# Patient Record
Sex: FEMALE | Race: WHITE | Employment: UNEMPLOYED | URBAN - METROPOLITAN AREA
[De-identification: names, ages, dates, MRNs, and addresses within clinical notes are randomized per-mention and may not be internally consistent; named-entity substitution may affect disease eponyms.]

---

## 2017-02-21 ENCOUNTER — TELEPHONE (OUTPATIENT)
Dept: FAMILY MEDICINE | Facility: CLINIC | Age: 42
End: 2017-02-21

## 2017-04-29 ENCOUNTER — HOSPITAL ENCOUNTER (EMERGENCY)
Facility: CLINIC | Age: 42
Discharge: HOME | End: 2017-04-29
Attending: EMERGENCY MEDICINE | Admitting: EMERGENCY MEDICINE
Payer: COMMERCIAL

## 2017-04-29 VITALS
DIASTOLIC BLOOD PRESSURE: 92 MMHG | SYSTOLIC BLOOD PRESSURE: 130 MMHG | BODY MASS INDEX: 31.41 KG/M2 | OXYGEN SATURATION: 99 % | HEIGHT: 60 IN | RESPIRATION RATE: 16 BRPM | WEIGHT: 160 LBS | TEMPERATURE: 98.3 F

## 2017-04-29 DIAGNOSIS — J02.0 STREPTOCOCCAL PHARYNGITIS: ICD-10-CM

## 2017-04-29 PROCEDURE — 25000131 ZZH RX MED GY IP 250 OP 636 PS 637: Performed by: EMERGENCY MEDICINE

## 2017-04-29 PROCEDURE — 99283 EMERGENCY DEPT VISIT LOW MDM: CPT | Performed by: EMERGENCY MEDICINE

## 2017-04-29 PROCEDURE — 99284 EMERGENCY DEPT VISIT MOD MDM: CPT | Mod: Z6 | Performed by: EMERGENCY MEDICINE

## 2017-04-29 RX ORDER — AMOXICILLIN 500 MG/1
1000 CAPSULE ORAL 2 TIMES DAILY
Qty: 40 CAPSULE | Refills: 0 | Status: SHIPPED | OUTPATIENT
Start: 2017-04-29 | End: 2017-05-09

## 2017-04-29 RX ADMIN — DEXAMETHASONE 10 MG: 2 TABLET ORAL at 21:59

## 2017-04-29 ASSESSMENT — ENCOUNTER SYMPTOMS
FEVER: 0
SORE THROAT: 1

## 2017-04-29 NOTE — ED AVS SNAPSHOT
[facility], Emergency Department    [address] [address]    Phone:  [phone]                                       Starr Martinez   MRN: [id number]    Department:  [facility], Emergency Department   Date of Visit:  4/29/2017           Patient Information     Date Of Birth          1975        Your diagnoses for this visit were:     Streptococcal pharyngitis        You were seen by Henrietta Dang MD.      Follow-up Information     Follow up with Joycelyn Rodriguez PA-C.    Specialty:  Physician Assistant    Contact information:    [facility]  [address]  [facility] [address]  [phone]        24 Hour Appointment Hotline       To make an appointment at any [facility] clinic, call [phone] ([phone]). If you don't have a family doctor or clinic, we will help you find one. [place] clinics are conveniently located to serve the needs of you and your family.             Review of your medicines      START taking        Dose / Directions Last dose taken    amoxicillin 500 MG capsule   Commonly known as:  AMOXIL   Dose:  1000 mg   Quantity:  40 capsule        Take 2 capsules (1,000 mg) by mouth 2 times daily for 10 days   Refills:  0          Our records show that you are taking the medicines listed below. If these are incorrect, please call your family doctor or clinic.        Dose / Directions Last dose taken    IBUPROFEN PO   Dose:  200 mg        Take 200 mg by mouth every 4 hours as needed for moderate pain   Refills:  0        order for DME   Quantity:  1 each        Equipment being ordered: bilateral wrist braces   Refills:  0                Prescriptions were sent or printed at these locations (1 Prescription)                   Other Prescriptions                Printed at Department/Unit printer (1 of 1)         amoxicillin (AMOXIL) 500 MG capsule                Orders Needing Specimen Collection     None      Pending Results     No orders found  "from 2017 to 2017.            Pending Culture Results     No orders found from 2017 to 2017.            Thank you for choosing [facility]       Thank you for choosing [facility] for your care. Our goal is always to provide you with excellent care. Hearing back from our patients is one way we can continue to improve our services. Please take a few minutes to complete the written survey that you may receive in the mail after you visit with us. Thank you!        [employer] Information     [employer] lets you send messages to your doctor, view your test results, renew your prescriptions, schedule appointments and more. To sign up, go to [email or web address] . Click on \"Log in\" on the left side of the screen, which will take you to the Welcome page. Then click on \"Sign up Now\" on the right side of the page.     You will be asked to enter the access code listed below, as well as some personal information. Please follow the directions to create your username and password.     Your access code is: [email or web address]  Expires: 2017  9:43 PM     Your access code will  in 90 days. If you need help or a new code, please call your [facility] clinic or [phone].        Care EveryWhere ID     This is your Care EveryWhere ID. This could be used by other organizations to access your [facility] medical records  [id number]        After Visit Summary       This is your record. Keep this with you and show to your community pharmacist(s) and doctor(s) at your next visit.                  "

## 2017-04-30 NOTE — ED PROVIDER NOTES
History     Chief Complaint   Patient presents with     Throat Pain     HPI  Starr Martinez is a 41 year old female who presents for a sore throat. The patient reports developing a sore throat this afternoon. Her son was diagnosed with strep yesterday and her daughter also has a sore throat. Pain 4/10. Dull ache. No medications taken PTA. No fevers. No other concerns or complaints.     Allergies   Allergen Reactions     Codeine Palpitations     I have reviewed the Medications, Allergies, Past Medical and Surgical History, and Social History in the Epic system.    Review of Systems   Constitutional: Negative for fever.   HENT: Positive for sore throat.    All other systems reviewed and are negative.      Physical Exam   BP: (!) 146/93  Heart Rate: 95  Temp: 98.3  F (36.8  C)  Resp: 16  Height: 152.4 cm (5')  Weight: 72.6 kg (160 lb)  SpO2: 95 %  Physical Exam   Constitutional: She is oriented to person, place, and time. She appears well-developed and well-nourished.   HENT:   Head: Atraumatic.   Right Ear: External ear normal.   Left Ear: External ear normal.   Tonsillar erythema and swelling without exudate   Eyes: Conjunctivae and EOM are normal. No scleral icterus.   Neck: Normal range of motion. Neck supple.   Cardiovascular: Normal rate and regular rhythm.    Pulmonary/Chest: Effort normal. No respiratory distress.   Abdominal: Soft. There is no tenderness. There is no rebound and no guarding.   Musculoskeletal: Normal range of motion. She exhibits no edema or tenderness.   Neurological: She is alert and oriented to person, place, and time.   Skin: Skin is warm and dry. No rash noted.   Psychiatric: She has a normal mood and affect. Her behavior is normal.   Nursing note and vitals reviewed.        ED Course     9:15 PM  The patient was seen and examined by dr. Dang in [facility].     ED Course     Procedures         Assessments & Plan (with Medical Decision Making)   41 year old with strep contact and throat pain.  Treating family with amoxicillin.  - Will do amox x 10 days  - no evidence of abscess  - 10m dex for symptoms  - PCP follow up was needed    I have reviewed the nursing notes.    I have reviewed the findings, diagnosis, plan and need for follow up with the patient.    New Prescriptions    AMOXICILLIN (AMOXIL) 500 MG CAPSULE    Take 2 capsules (1,000 mg) by mouth 2 times daily for 10 days       Final diagnoses:   Streptococcal pharyngitis     ILynne, am serving as a trained medical scribe to document services personally performed by Henrietta Dang MD, based on the provider's statements to me.      IHenrietta MD, was physically present and have reviewed and verified the accuracy of this note documented by Lynne Hassan.     4/29/2017   [facility], [place], EMERGENCY DEPARTMENT     Henrietta Dang MD  04/29/17 5452

## 2017-07-01 ENCOUNTER — HEALTH MAINTENANCE LETTER (OUTPATIENT)
Age: 42
End: 2017-07-01

## 2017-10-16 ENCOUNTER — OFFICE VISIT (OUTPATIENT)
Dept: FAMILY MEDICINE | Facility: CLINIC | Age: 42
End: 2017-10-16

## 2017-10-16 VITALS
OXYGEN SATURATION: 97 % | TEMPERATURE: 98 F | DIASTOLIC BLOOD PRESSURE: 86 MMHG | BODY MASS INDEX: 33.04 KG/M2 | WEIGHT: 175 LBS | HEART RATE: 78 BPM | HEIGHT: 61 IN | RESPIRATION RATE: 14 BRPM | SYSTOLIC BLOOD PRESSURE: 121 MMHG

## 2017-10-16 DIAGNOSIS — M79.7 FIBROMYALGIA: ICD-10-CM

## 2017-10-16 DIAGNOSIS — Z72.0 TOBACCO USE: ICD-10-CM

## 2017-10-16 DIAGNOSIS — K90.0 CELIAC DISEASE: ICD-10-CM

## 2017-10-16 DIAGNOSIS — R10.32 ABDOMINAL PAIN, LEFT LOWER QUADRANT: Primary | ICD-10-CM

## 2017-10-16 DIAGNOSIS — R53.82 CHRONIC FATIGUE: ICD-10-CM

## 2017-10-16 LAB
ALBUMIN SERPL-MCNC: 3.6 G/DL (ref 3.4–5)
ALP SERPL-CCNC: 81 U/L (ref 40–150)
ALT SERPL W P-5'-P-CCNC: 23 U/L (ref 0–50)
ANION GAP SERPL CALCULATED.3IONS-SCNC: 6 MMOL/L (ref 3–14)
AST SERPL W P-5'-P-CCNC: 12 U/L (ref 0–45)
BASOPHILS # BLD AUTO: 0 10E9/L (ref 0–0.2)
BASOPHILS NFR BLD AUTO: 0.4 %
BILIRUB SERPL-MCNC: 0.5 MG/DL (ref 0.2–1.3)
BUN SERPL-MCNC: 12 MG/DL (ref 7–30)
CALCIUM SERPL-MCNC: 9 MG/DL (ref 8.5–10.1)
CHLORIDE SERPL-SCNC: 107 MMOL/L (ref 94–109)
CO2 SERPL-SCNC: 26 MMOL/L (ref 20–32)
CREAT SERPL-MCNC: 0.62 MG/DL (ref 0.52–1.04)
DIFFERENTIAL METHOD BLD: ABNORMAL
EOSINOPHIL # BLD AUTO: 0.8 10E9/L (ref 0–0.7)
EOSINOPHIL NFR BLD AUTO: 8.1 %
ERYTHROCYTE [DISTWIDTH] IN BLOOD BY AUTOMATED COUNT: 13.4 % (ref 10–15)
FERRITIN SERPL-MCNC: 30 NG/ML (ref 12–150)
GFR SERPL CREATININE-BSD FRML MDRD: >90 ML/MIN/1.7M2
GLUCOSE SERPL-MCNC: 79 MG/DL (ref 70–99)
HCT VFR BLD AUTO: 43.7 % (ref 35–47)
HGB BLD-MCNC: 14.5 G/DL (ref 11.7–15.7)
IMM GRANULOCYTES # BLD: 0 10E9/L (ref 0–0.4)
IMM GRANULOCYTES NFR BLD: 0.2 %
LYMPHOCYTES # BLD AUTO: 1.8 10E9/L (ref 0.8–5.3)
LYMPHOCYTES NFR BLD AUTO: 17.7 %
MCH RBC QN AUTO: 32.2 PG (ref 26.5–33)
MCHC RBC AUTO-ENTMCNC: 33.2 G/DL (ref 31.5–36.5)
MCV RBC AUTO: 97 FL (ref 78–100)
MONOCYTES # BLD AUTO: 0.8 10E9/L (ref 0–1.3)
MONOCYTES NFR BLD AUTO: 8.5 %
NEUTROPHILS # BLD AUTO: 6.4 10E9/L (ref 1.6–8.3)
NEUTROPHILS NFR BLD AUTO: 65.1 %
NRBC # BLD AUTO: 0 10*3/UL
NRBC BLD AUTO-RTO: 0 /100
PLATELET # BLD AUTO: 244 10E9/L (ref 150–450)
POTASSIUM SERPL-SCNC: 3.8 MMOL/L (ref 3.4–5.3)
PROT SERPL-MCNC: 7 G/DL (ref 6.8–8.8)
RBC # BLD AUTO: 4.51 10E12/L (ref 3.8–5.2)
SODIUM SERPL-SCNC: 140 MMOL/L (ref 133–144)
TSH SERPL DL<=0.005 MIU/L-ACNC: 0.92 MU/L (ref 0.4–4)
WBC # BLD AUTO: 9.9 10E9/L (ref 4–11)

## 2017-10-16 ASSESSMENT — ENCOUNTER SYMPTOMS
SORE THROAT: 0
COUGH: 0
CHEST TIGHTNESS: 0
LIGHT-HEADEDNESS: 0
RHINORRHEA: 0
NERVOUS/ANXIOUS: 1
ANAL BLEEDING: 0
HEADACHES: 0
ARTHRALGIAS: 1
SLEEP DISTURBANCE: 1
CONSTIPATION: 1
SHORTNESS OF BREATH: 0
ACTIVITY CHANGE: 1
DIARRHEA: 1
BLOOD IN STOOL: 0
DIZZINESS: 0
APPETITE CHANGE: 0
FLANK PAIN: 0
MYALGIAS: 1
FATIGUE: 1
VOMITING: 0
NAUSEA: 0
UNEXPECTED WEIGHT CHANGE: 0
ABDOMINAL PAIN: 1
CHILLS: 0
FEVER: 0

## 2017-10-16 NOTE — MR AVS SNAPSHOT
"              After Visit Summary   10/16/2017    Starr Martinez    MRN: [id number]           Patient Information     Date Of Birth          1975        Visit Information        Provider Department      10/16/2017 1:00 PM Oma Cifuentes NP [facility]        Today's Diagnoses     Abdominal pain, left lower quadrant    -  1    Celiac disease        Fibromyalgia        Chronic fatigue           Follow-ups after your visit        Additional Services     PHYSICAL THERAPY REFERRAL       *This therapy referral will be filtered to a centralized scheduling office at [facility] and the patient will receive a call to schedule an appointment at a [facility] location most convenient for them. *     [facility] provides Physical Therapy evaluation and treatment and many specialty services across the [facility] system.  If requesting a specialty program, please choose from the list below.    If you have not heard from the scheduling office within 2 business days, please call [phone] for all locations, with the exception of [place], please call [phone].  Treatment: Evaluation & Treatment  Special Instructions/Modalities: PT to eval and treat for fibromyalgia pain  Special Programs: Aquatic Therapy ([place], [place] and [place] locations only)    Please be aware that coverage of these services is subject to the terms and limitations of your health insurance plan.  Call member services at your health plan with any benefit or coverage questions.      **Note to Provider:  If you are referring outside of [facility] for the therapy appointment, please list the name of the location in the \"special instructions\" above, print the referral and give to the patient to schedule the appointment.                  Future tests that were ordered for you today     Open Future Orders        Priority Expected Expires Ordered    US Abd Cmpl Abd/Pelvis Duplex Cmpl Routine  10/16/2018 " "10/16/2017            Who to contact     Please call your clinic at [phone] to:    Ask questions about your health    Make or cancel appointments    Discuss your medicines    Learn about your test results    Speak to your doctor   If you have compliments or concerns about an experience at your clinic, or if you wish to file a complaint, please contact [facility] Patient Relations at [phone] or email us at [email or web address]         Additional Information About Your Visit        [facility] [employer] Information     [employer] is an electronic gateway that provides easy, online access to your medical records. With [employer], you can request a clinic appointment, read your test results, renew a prescription or communicate with your care team.     To sign up for [employer] visit the website at [email or web address]   You will be asked to enter the access code listed below, as well as some personal information. Please follow the directions to create your username and password.     Your access code is: [email or web address]  Expires: 2018  2:08 PM     Your access code will  in 90 days. If you need help or a new code, please contact your [facility] or call [phone] for assistance.        Care EveryWhere ID     This is your Care EveryWhere ID. This could be used by other organizations to access your [place] medical records  [id number]        Your Vitals Were     Pulse Temperature Respirations Height Pulse Oximetry Breastfeeding?    78 98  F (36.7  C) (Oral) 14 5' 0.7\" (154.2 cm) 97% No    BMI (Body Mass Index)                   33.39 kg/m2            Blood Pressure from Last 3 Encounters:   10/16/17 121/86   17 (!) 130/92   16 112/82    Weight from Last 3 Encounters:   10/16/17 175 lb (79.4 kg)   17 160 lb (72.6 kg)   16 174 lb 6.4 oz (79.1 kg)              We Performed the Following     CBC with platelets differential     " Comprehensive metabolic panel     Ferritin     PHYSICAL THERAPY REFERRAL     TSH     Vitamin D Deficiency        Primary Care Provider Office Phone # Fax #    Joycelyn Rodriguez PA-C [phone]       [address]  [place] [facility] [address]        Equal Access to Services     IRLANDA VÁZQUEZ : Hadii aad ku hadasho Soomaali, waaxda luqadaha, qaybta kaalmada adeegyada, waxay idiin hayaan adeeg khbearsh lasony hay. So [facility] [phone].    ATENCIÓN: Si habla español, tiene a palacio disposición servicios gratuitos de asistencia lingüística. Llame al [phone].    We comply with applicable federal civil rights laws and [place] laws. We do not discriminate on the basis of race, color, national origin, age, disability, sex, sexual orientation, or gender identity.            Thank you!     Thank you for choosing [facility]  for your care. Our goal is always to provide you with excellent care. Hearing back from our patients is one way we can continue to improve our services. Please take a few minutes to complete the written survey that you may receive in the mail after your visit with us. Thank you!             Your Updated Medication List - Protect others around you: Learn how to safely use, store and throw away your medicines at www.disposemymeds.org.          This list is accurate as of: 10/16/17  2:08 PM.  Always use your most recent med list.                   Brand Name Dispense Instructions for use Diagnosis    IBUPROFEN PO      Take 200 mg by mouth every 4 hours as needed for moderate pain

## 2017-10-16 NOTE — NURSING NOTE
"42 year old  Chief Complaint   Patient presents with     Pain     Lower abdomen on/off. Faigue. Pt has fibro, feels tired at work. Pain Scale 2/10       Blood pressure 121/86, pulse 78, temperature 98  F (36.7  C), temperature source Oral, resp. rate 14, height 5' 0.7\" (154.2 cm), weight 175 lb (79.4 kg), SpO2 97 %, not currently breastfeeding. Body mass index is 33.39 kg/(m^2).  BP completed using cuff size: regular    Patient Active Problem List   Diagnosis     Irritable bowel syndrome with both constipation and diarrhea       Wt Readings from Last 2 Encounters:   10/16/17 175 lb (79.4 kg)   04/29/17 160 lb (72.6 kg)     BP Readings from Last 3 Encounters:   10/16/17 121/86   04/29/17 (!) 130/92   12/21/16 112/82       Current Outpatient Prescriptions   Medication     IBUPROFEN PO     No current facility-administered medications for this visit.        Social History   Substance Use Topics     Smoking status: Current Every Day Smoker     Packs/day: 0.50     Smokeless tobacco: Never Used     Alcohol use Yes      Comment: rare       Health Maintenance Due   Topic Date Due     TETANUS IMMUNIZATION (SYSTEM ASSIGNED)  08/23/1992     PAP SCREENING Q3 YR (SYSTEM ASSIGNED)  06/02/2007     INFLUENZA VACCINE (SYSTEM ASSIGNED)  09/01/2017       No results found for: PAP    PHQ-2 ( 1999 Pfizer) 10/16/2017   Q1: Little interest or pleasure in doing things 0   Q2: Feeling down, depressed or hopeless 0   PHQ-2 Score 0       No flowsheet data found.    No flowsheet data found.    No flowsheet data found.    Michelle Williamson CMA  October 16, 2017 1:18 PM  "

## 2017-10-16 NOTE — PROGRESS NOTES
"      HPI:       Starr Martinez is a 42 year old who presents for the following  Patient presents with:  Pain: Lower abdomen on/off. Faigue. Pt has fibro, feels tired at work. Pain Scale 2/10    Starr is a 42 year old female with hx of Fibromyalgia and celiac disease and recently just recovered from Bell's Palsy. She comes to clinic with cc of LLQ abdominal pain, fatigue. The patient is new to this clinic and this writer.    LLQ abdominal pain: Pain started several months ago and is increasing in the number of episodes she gets. Pain location is in the left lower quadrant, sometimes she will get Stomach pain. Pain comes and goes, didn't go to work today, the pain episodes last for 1-2 days, she describes the pain like little pins stabbing her, nothing makes the pain worse or better. In general, with celiac disease, she has some irregular bowels for her baseline, she gets constipation and diarrhea. She will sometimes go 3-4 days without having a bm. Then gets diarrhea. Gets diarrhea and constipation. No blood in stool, no fevers, appetite stable, eating 1 meal a day. She does her best to avoid gluten but will sometimes \"cheat\" and this will disrupt her bowels. She has hx of cholecystectomy.    LMP 2 weeks ago, not on currently on cycle. Her menstrual cycle is Regular, monthly, lasts for 3-4 days. No hx of abnormal paps. No new partners, has been with boyfriend x 2 years.     Fibromyalgia and Fatigue: Hard to keep her eyes open, wants to fall asleep. She describes feeling weak in her chest and in her heart. Symptoms have been present for the last several months. Takes ibuprofen nearly daily 600-800 mg a day for fibromyalgia pain in her knees and hips. Tries to just take Ibuprofen once a day, does not like taking medication unless she has to. Her [place] sleepiness scale rating is 22 on the sleepiness scale    Problem, Medication and Allergy Lists were reviewed and are current.  Patient is a new patient to this clinic " "and so  I reviewed/updated the Past Medical History, the Family History and the Social History.     Past Medical History:   Diagnosis Date     Unspecified hypothyroidism      Unspecified otitis media      Past Surgical History:   Procedure Laterality Date     CHOLECYSTECTOMY       GYN SURGERY      uterine \"burned\"     SOFT TISSUE SURGERY      breast reduction     SURGICAL HISTORY OF -       cholecystectomy     Social History     Social History     Marital status: Single     Spouse name: N/A     Number of children: 3     Years of education: Some College     Occupational History           Customer service     Social History Main Topics     Smoking status: Current Every Day Smoker     Packs/day: 0.50     Smokeless tobacco: Never Used     Alcohol use Yes      Comment: rare once a month     Drug use: No     Sexual activity: Yes     Partners: Male     Other Topics Concern     Not on file     Social History Narrative    ** Merged History Encounter **          Family History   Problem Relation Age of Onset     Arthritis Paternal Grandmother      Eye Disorder Paternal Grandmother      OSTEOPOROSIS Paternal Grandmother      DIABETES Paternal Grandfather      Thyroid Disease Mother      DIABETES Father           Review of Systems:   Review of Systems   Constitutional: Positive for activity change and fatigue. Negative for appetite change, chills, fever and unexpected weight change.   HENT: Negative for congestion, postnasal drip, rhinorrhea and sore throat.    Respiratory: Negative for cough, chest tightness and shortness of breath.    Cardiovascular: Negative for chest pain.   Gastrointestinal: Positive for abdominal pain, constipation and diarrhea. Negative for anal bleeding, blood in stool, nausea and vomiting.   Endocrine: Negative for cold intolerance and heat intolerance.   Genitourinary: Negative for flank pain, menstrual problem, vaginal bleeding and vaginal pain.   Musculoskeletal: Positive for arthralgias and " "myalgias.   Skin: Negative for rash.   Neurological: Negative for dizziness, light-headedness and headaches.   Psychiatric/Behavioral: Positive for sleep disturbance. Negative for suicidal ideas. The patient is nervous/anxious.              Physical Exam:   Patient Vitals for the past 24 hrs:   BP Temp Temp src Pulse Resp SpO2 Height Weight   10/16/17 1316 121/86 98  F (36.7  C) Oral 78 14 97 % 5' 0.7\" (154.2 cm) 175 lb (79.4 kg)     Body mass index is 33.39 kg/(m^2).  Vitals were reviewed and were normal     Physical Exam   Constitutional: She is oriented to person, place, and time. She appears well-developed and well-nourished.   Appears tired with heavy eyelids   HENT:   Head: Normocephalic and atraumatic.   Right Ear: Tympanic membrane, external ear and ear canal normal.   Left Ear: Tympanic membrane, external ear and ear canal normal.   Nose: Nose normal.   Mouth/Throat: Mucous membranes are normal.   Eyes: Pupils are equal, round, and reactive to light.   Neck: Neck supple.   Cardiovascular: Normal rate and regular rhythm.    Pulmonary/Chest: Effort normal and breath sounds normal.   Abdominal: Soft. Bowel sounds are normal. There is no hepatosplenomegaly. There is tenderness in the left lower quadrant. There is no CVA tenderness.   Musculoskeletal: Normal range of motion.   Pain in knees and hips   Lymphadenopathy:     She has no cervical adenopathy.   Neurological: She is alert and oriented to person, place, and time.   Skin: Skin is warm and dry.   Psychiatric: She has a normal mood and affect. Her behavior is normal.         Results:      Results from the last 24 hoursNo results found for this or any previous visit (from the past 24 hour(s)).  Assessment and Plan     Starr was seen today for pain.    Diagnoses and all orders for this visit:    Abdominal pain, left lower quadrant  -     US Abd Cmpl Abd/Pelvis Duplex Cmpl; Future  LLQ abdominal pain x 3 months with pains occurring more frequently, pain " intermittent. Some discomfort to deep palpation on exam, no masses palpated. Get lab work. Consider pelvis/abdominal US if no improvement. Discussed pain may be related to her celiac disease and fibromyalgia. She did just start a new job, this has been a positive thing for her but has still created anxiety with the change. She started her new job 2 weeks ago.     Celiac disease  Educated patient to do her best with adhering to a gluten free diet to see if this helps the LLQ abdominal pain.     Fibromyalgia  -     PHYSICAL THERAPY REFERRAL  Discussed non-pharmacological options of treatment for fibromyalgia. She does not desire to see a counselor or therapist. She is interested in doing acupuncture or chiropractic therapy, she also is open to aquatic physical therapy. Reviewed her lifestyle patterns and behaviors, discussed the importance of regular activity, will get her started in PT to help her engage in some aquatic PT. Discussed the importance of a good support network.     Chronic fatigue  -     CBC with platelets differential  -     Comprehensive metabolic panel  -     TSH  -     Ferritin  -     Vitamin D Deficiency  Discussed that this may be related to her fibromyalgia, or it may not, will check labs.  Denies suicidal thoughts and feelings. Consider depression, anxiety. Encouraged patient to consider counseling services and to update clinic if she changes her mind and would like referral. She had a score of 22 on the [place] Sleepiness index and denies snoring or sleep apnea symptoms.     Tobacco use  Smoking 1/2 ppd of cig, desires to cut back, 3 minutes spent on smoking cessation counseling. She will cut back and update clinic if she desires smoking cessation products.     Medications Discontinued During This Encounter   Medication Reason     order for DME Therapy completed     Options for treatment and follow-up care were reviewed with the patient. Starr Martinez  engaged in the decision making process and  verbalized understanding of the options discussed and agreed with the final plan.    Oma Cifuentes, NP

## 2017-10-16 NOTE — LETTER
[facility]  [address] [address]  Phone: [phone]  Fax: [phone]    October 16, 2017        Starr Martinez  [address] [place] [id number]          To whom it may concern:    RE: Starr Martinez    Patient was seen and treated today at our clinic. Please excuse her from work.     Please contact me for questions or concerns [phone]      Sincerely,        Oma Cifuentes NP

## 2017-10-16 NOTE — LETTER
October 17, 2017       TO: Starr Martinez  [address] [facility] [address]       Dear ,    We are writing to inform you of your test results.    Test results indicate you may require additional follow up, see comment below.    Resulted Orders   CBC with platelets differential   Result Value Ref Range    WBC 9.9 4.0 - 11.0 10e9/L    RBC Count 4.51 3.8 - 5.2 10e12/L    Hemoglobin 14.5 11.7 - 15.7 g/dL    Hematocrit 43.7 35.0 - 47.0 %    MCV 97 78 - 100 fl    MCH 32.2 26.5 - 33.0 pg    MCHC 33.2 31.5 - 36.5 g/dL    RDW 13.4 10.0 - 15.0 %    Platelet Count 244 150 - 450 10e9/L    Diff Method Automated Method     % Neutrophils 65.1 %    % Lymphocytes 17.7 %    % Monocytes 8.5 %    % Eosinophils 8.1 %    % Basophils 0.4 %    % Immature Granulocytes 0.2 %    Nucleated RBCs 0 0 /100    Absolute Neutrophil 6.4 1.6 - 8.3 10e9/L    Absolute Lymphocytes 1.8 0.8 - 5.3 10e9/L    Absolute Monocytes 0.8 0.0 - 1.3 10e9/L    Absolute Eosinophils 0.8 (H) 0.0 - 0.7 10e9/L    Absolute Basophils 0.0 0.0 - 0.2 10e9/L    Abs Immature Granulocytes 0.0 0 - 0.4 10e9/L    Absolute Nucleated RBC 0.0    Comprehensive metabolic panel   Result Value Ref Range    Sodium 140 133 - 144 mmol/L    Potassium 3.8 3.4 - 5.3 mmol/L    Chloride 107 94 - 109 mmol/L    Carbon Dioxide 26 20 - 32 mmol/L    Anion Gap 6 3 - 14 mmol/L    Glucose 79 70 - 99 mg/dL    Urea Nitrogen 12 7 - 30 mg/dL    Creatinine 0.62 0.52 - 1.04 mg/dL    GFR Estimate >90 >60 mL/min/1.7m2      Comment:      Non  GFR Calc    GFR Estimate If Black >90 >60 mL/min/1.7m2      Comment:       GFR Calc    Calcium 9.0 8.5 - 10.1 mg/dL    Bilirubin Total 0.5 0.2 - 1.3 mg/dL    Albumin 3.6 3.4 - 5.0 g/dL    Protein Total 7.0 6.8 - 8.8 g/dL    Alkaline Phosphatase 81 40 - 150 U/L    ALT 23 0 - 50 U/L    AST 12 0 - 45 U/L   TSH   Result Value Ref Range    TSH 0.92 0.40 - 4.00 mU/L   Ferritin   Result Value Ref Range    Ferritin 30 12 - 150 ng/mL    Vitamin D Deficiency   Result Value Ref Range    Vitamin D Deficiency screening 16 (L) 20 - 75 ug/L      Comment:      Season, race, dietary intake, and treatment affect the concentration of   25-hydroxy-Vitamin D. Values may decrease during winter months and increase   during summer months. Values 20-29 ug/L may indicate Vitamin D insufficiency   and values <20 ug/L may indicate Vitamin D deficiency.  Vitamin D determination is routinely performed by an immunoassay specific for   25 hydroxyvitamin D3.  If an individual is on vitamin D2 (ergocalciferol)   supplementation, please specify 25 OH vitamin D2 and D3 level determination by   LCMSMS test VITD23.       You have a low vitamin D level at 16. Please start your vitamin D supplementation with 50,000 international units (IU) by mouth weekly for eight weeks. After completion of this, please follow up with over the counter supplementation of vitamin D 1,000 IU daily.  If you have questions, please call the clinic at [phone].    Sincerely,     Oma Cifuentes DNP, RN, FNP-BC

## 2017-10-17 ENCOUNTER — TELEPHONE (OUTPATIENT)
Dept: FAMILY MEDICINE | Facility: CLINIC | Age: 42
End: 2017-10-17

## 2017-10-17 DIAGNOSIS — E55.9 VITAMIN D DEFICIENCY: Primary | ICD-10-CM

## 2017-10-17 LAB — DEPRECATED CALCIDIOL+CALCIFEROL SERPL-MC: 16 UG/L (ref 20–75)

## 2017-10-17 NOTE — TELEPHONE ENCOUNTER
Left VM, patient with vitamin D deficiency. Will start supplementation. Will discuss if patient would prefer to monitor abdominal pain or proceed with US.

## 2017-10-24 ENCOUNTER — OFFICE VISIT (OUTPATIENT)
Dept: FAMILY MEDICINE | Facility: CLINIC | Age: 42
End: 2017-10-24
Payer: COMMERCIAL

## 2017-10-24 ENCOUNTER — TELEPHONE (OUTPATIENT)
Dept: FAMILY MEDICINE | Facility: CLINIC | Age: 42
End: 2017-10-24

## 2017-10-24 VITALS
DIASTOLIC BLOOD PRESSURE: 86 MMHG | HEART RATE: 68 BPM | SYSTOLIC BLOOD PRESSURE: 133 MMHG | BODY MASS INDEX: 34.16 KG/M2 | OXYGEN SATURATION: 95 % | WEIGHT: 179 LBS | TEMPERATURE: 97.6 F

## 2017-10-24 DIAGNOSIS — J22 LOWER RESPIRATORY INFECTION (E.G., BRONCHITIS, PNEUMONIA, PNEUMONITIS, PULMONITIS): Primary | ICD-10-CM

## 2017-10-24 DIAGNOSIS — Z71.6 ENCOUNTER FOR TOBACCO USE CESSATION COUNSELING: ICD-10-CM

## 2017-10-24 PROCEDURE — 99213 OFFICE O/P EST LOW 20 MIN: CPT | Performed by: NURSE PRACTITIONER

## 2017-10-24 RX ORDER — VARENICLINE TARTRATE 1 MG/1
1 TABLET, FILM COATED ORAL 2 TIMES DAILY
Qty: 60 TABLET | Refills: 2 | Status: SHIPPED | OUTPATIENT
Start: 2017-10-24

## 2017-10-24 RX ORDER — AZITHROMYCIN 250 MG/1
TABLET, FILM COATED ORAL
Qty: 6 TABLET | Refills: 0 | Status: SHIPPED | OUTPATIENT
Start: 2017-10-24

## 2017-10-24 ASSESSMENT — PAIN SCALES - GENERAL: PAINLEVEL: NO PAIN (0)

## 2017-10-24 ASSESSMENT — PATIENT HEALTH QUESTIONNAIRE - PHQ9: SUM OF ALL RESPONSES TO PHQ QUESTIONS 1-9: 0

## 2017-10-24 NOTE — TELEPHONE ENCOUNTER
Panel Management Review      Patient has the following on her problem list:     Depression / Dysthymia review    Measure:  Needs PHQ-9 score of 4 or less during index window.  Administer PHQ-9 and if score is 5 or more, send encounter to provider for next steps.    5 - 7 month window range:     PHQ-9 SCORE 10/24/2017   Total Score 0       If PHQ-9 recheck is 5 or more, route to provider for next steps.    Patient is due for:  PHQ9 and DAP        Composite cancer screening  Chart review shows that this patient is due/due soon for the following Pap Smear  Summary:    Patient is due/failing the following:   DAP, PAP and PHQ9    Action needed:   Patient needs office visit for physical with pap screen and patient needs to do PHQ9 and DAP.    Type of outreach:    Spoke with patient and scheduled physical with pap screen for 10/28/17 with Yuki Barrett CP and PHQ9 done score is 0    Questions for provider review:    None                                                                                                                                    ZOE De Paz MA       Chart routed to closing chart.   .

## 2017-10-24 NOTE — MR AVS SNAPSHOT
"              After Visit Summary   10/24/2017    Starr Martinez    MRN: [id number]           Patient Information     Date Of Birth          1975        Visit Information        Provider Department      10/24/2017 4:00 PM Yuki Barrett APRN CNP [facility]        Today's Diagnoses     Lower respiratory infection (e.g., bronchitis, pneumonia, pneumonitis, pulmonitis)    -  1    Encounter for tobacco use cessation counseling           Follow-ups after your visit        Your next 10 appointments already scheduled     Nov 28, 2017  5:40 PM CST   PHYSICAL with ALETHA Nuñez CNP   [facility]    [address] [facility] [id number] [phone]              Who to contact     If you have questions or need follow up information about today's clinic visit or your schedule please contact [facility] directly at [phone].  Normal or non-critical lab and imaging results will be communicated to you by MyChart, letter or phone within 4 business days after the clinic has received the results. If you do not hear from us within 7 days, please contact the clinic through [email or web address] or phone. If you have a critical or abnormal lab result, we will notify you by phone as soon as possible.  Submit refill requests through [employer] or call your pharmacy and they will forward the refill request to us. Please allow 3 business days for your refill to be completed.          Additional Information About Your Visit        [email or web address] Information     [employer] lets you send messages to your doctor, view your test results, renew your prescriptions, schedule appointments and more. To sign up, go to [email or web address] . Click on \"Log in\" on the left side of the screen, which will take you to the Welcome page. Then click on \"Sign up Now\" on the right side of the page.     You will be asked to " enter the access code listed below, as well as some personal information. Please follow the directions to create your username and password.     Your access code is: [email or web address]  Expires: 2018  2:08 PM     Your access code will  in 90 days. If you need help or a new code, please call your [facility] clinic or [phone].        Care EveryWhere ID     This is your Care EveryWhere ID. This could be used by other organizations to access your [facility] medical records  [id number]        Your Vitals Were     Pulse Temperature Pulse Oximetry Breastfeeding? BMI (Body Mass Index)       68 97.6  F (36.4  C) (Oral) 95% No 34.16 kg/m2        Blood Pressure from Last 3 Encounters:   10/24/17 133/86   10/16/17 121/86   17 (!) 130/92    Weight from Last 3 Encounters:   10/24/17 179 lb (81.2 kg)   10/16/17 175 lb (79.4 kg)   17 160 lb (72.6 kg)              Today, you had the following     No orders found for display         Today's Medication Changes          These changes are accurate as of: 10/24/17  4:40 PM.  If you have any questions, ask your nurse or doctor.               Start taking these medicines.        Dose/Directions    azithromycin 250 MG tablet   Commonly known as:  ZITHROMAX   Used for:  Lower respiratory infection (e.g., bronchitis, pneumonia, pneumonitis, pulmonitis)   Started by:  Yuki Barrett APRN CNP        Two tablets first day, then one tablet daily for four days.   Quantity:  6 tablet   Refills:  0       * varenicline 0.5 MG X 11 & 1 MG X 42 tablet   Commonly known as:  CHANTIX STARTING MONTH    Used for:  Encounter for tobacco use cessation counseling   Started by:  Yuki Barrett APRN CNP        Take 0.5 mg tab daily for 3 days, then 0.5 mg tab twice daily for 4 days, then 1 mg twice daily.   Quantity:  53 tablet   Refills:  0       * varenicline 1 MG tablet   Commonly known as:  CHANTIX   Used for:  Encounter for tobacco use cessation counseling   Started  by:  Yuki Barrett APRN CNP        Dose:  1 mg   Take 1 tablet (1 mg) by mouth 2 times daily   Quantity:  60 tablet   Refills:  2       * Notice:  This list has 2 medication(s) that are the same as other medications prescribed for you. Read the directions carefully, and ask your doctor or other care provider to review them with you.         Where to get your medicines      These medications were sent to [facility] - [place], [place] - [address]  [address], [facility] [address]     Phone:  [phone]     azithromycin 250 MG tablet    varenicline 0.5 MG X 11 & 1 MG X 42 tablet    varenicline 1 MG tablet                Primary Care Provider Office Phone # Fax #    Joycelyn Rodriguez PA-C [phone]       [address]  [place] [facility] [address]        Equal Access to Services     [facility]: Hadii aad ku hadasho Soomaali, waaxda luqadaha, qaybta kaalmada adeegyada, waxay idiin hayaan osvaldo chen . So [place] -[phone].    ATENCIÓN: Si habla español, tiene a palacio disposición servicios gratuitos de asistencia lingüística. Manasairina al [phone].    We comply with applicable federal civil rights laws and [place] laws. We do not discriminate on the basis of race, color, national origin, age, disability, sex, sexual orientation, or gender identity.            Thank you!     Thank you for choosing [facility]  for your care. Our goal is always to provide you with excellent care. Hearing back from our patients is one way we can continue to improve our services. Please take a few minutes to complete the written survey that you may receive in the mail after your visit with us. Thank you!             Your Updated Medication List - Protect others around you: Learn how to safely use, store and throw away your medicines at www.disposemymeds.org.          This list is accurate as of: 10/24/17  4:40 PM.  Always use  your most recent med list.                   Brand Name Dispense Instructions for use Diagnosis    azithromycin 250 MG tablet    ZITHROMAX    6 tablet    Two tablets first day, then one tablet daily for four days.    Lower respiratory infection (e.g., bronchitis, pneumonia, pneumonitis, pulmonitis)       cholecalciferol 47379 UNITS capsule    VITAMIN D3    8 capsule    Take 1 capsule (50,000 Units) by mouth once a week    Vitamin D deficiency       IBUPROFEN PO      Take 200 mg by mouth every 4 hours as needed for moderate pain        * varenicline 0.5 MG X 11 & 1 MG X 42 tablet    CHANTIX STARTING MONTH FLORENCIA    53 tablet    Take 0.5 mg tab daily for 3 days, then 0.5 mg tab twice daily for 4 days, then 1 mg twice daily.    Encounter for tobacco use cessation counseling       * varenicline 1 MG tablet    CHANTIX    60 tablet    Take 1 tablet (1 mg) by mouth 2 times daily    Encounter for tobacco use cessation counseling       * Notice:  This list has 2 medication(s) that are the same as other medications prescribed for you. Read the directions carefully, and ask your doctor or other care provider to review them with you.

## 2017-10-24 NOTE — NURSING NOTE
"Chief Complaint   Patient presents with     URI       Initial /86 (BP Location: Right arm, Patient Position: Chair, Cuff Size: Adult Regular)  Pulse 68  Temp 97.6  F (36.4  C) (Oral)  Wt 179 lb (81.2 kg)  SpO2 95%  Breastfeeding? No  BMI 34.16 kg/m2 Estimated body mass index is 34.16 kg/(m^2) as calculated from the following:    Height as of 10/16/17: 5' 0.7\" (1.542 m).    Weight as of this encounter: 179 lb (81.2 kg).  Medication Reconciliation: complete.  ZOE De Paz MA      "

## 2017-10-24 NOTE — PROGRESS NOTES
"  SUBJECTIVE:   Starr Martinez is a 42 year old female who presents to clinic today for the following health issues:      Acute Illness   Acute illness concerns: Cough  Onset: 7 days    Fever: no    Chills/Sweats: no    Headache (location?): no    Sinus Pressure:YES- facial pain    Conjunctivitis:  no    Ear Pain: no    Rhinorrhea: YES    Congestion: YES, chest    Sore Throat: no     Cough: YES-productive of yellow sputum    Wheeze: YES    Decreased Appetite: no    Nausea: no    Vomiting: no    Diarrhea:  no    Dysuria/Freq.: no    Fatigue/Achiness: YES    Sick/Strep Exposure: no     Therapies Tried and outcome: OTC stevan seltzer nightquil and dayquil    She is a smoker  No fevers  Cough worsening over past week and feels SOB    She is interested in quitting smoking  Has tried Chantix in the past  Stopped taking. Not sure why  Tolerated in the past    Problem list and histories reviewed & adjusted, as indicated.  Additional history: none    Patient Active Problem List   Diagnosis     Irritable bowel syndrome with both constipation and diarrhea     Abdominal pain, left lower quadrant     Celiac disease     Fibromyalgia     Chronic fatigue     Tobacco use     Depression with anxiety     Vitamin D deficiency     Past Surgical History:   Procedure Laterality Date     CHOLECYSTECTOMY       GYN SURGERY      uterine \"burned\"     SOFT TISSUE SURGERY      breast reduction     SURGICAL HISTORY OF -       cholecystectomy       Social History   Substance Use Topics     Smoking status: Current Every Day Smoker     Packs/day: 0.50     Smokeless tobacco: Never Used     Alcohol use Yes      Comment: rare once a month     Family History   Problem Relation Age of Onset     Arthritis Paternal Grandmother      Eye Disorder Paternal Grandmother      OSTEOPOROSIS Paternal Grandmother      DIABETES Paternal Grandfather      Thyroid Disease Mother      DIABETES Father              Reviewed and updated as needed this visit by clinical " staffTobacco  Allergies  Meds  Med Hx  Surg Hx  Fam Hx  Soc Hx      Reviewed and updated as needed this visit by Provider         ROS:  Constitutional, HEENT, cardiovascular, pulmonary, gi and gu systems are negative, except as otherwise noted.      OBJECTIVE:   /86 (BP Location: Right arm, Patient Position: Chair, Cuff Size: Adult Regular)  Pulse 68  Temp 97.6  F (36.4  C) (Oral)  Wt 179 lb (81.2 kg)  SpO2 95%  Breastfeeding? No  BMI 34.16 kg/m2  Body mass index is 34.16 kg/(m^2).  GENERAL: healthy, alert and no distress  HENT: ear canals and TM's normal, nose and mouth without ulcers or lesions  NECK: no adenopathy, no asymmetry, masses, or scars and thyroid normal to palpation  RESP: good excursion bilaterally. No crackles. Scattered expiratory wheezes  CV: regular rate and rhythm, normal S1 S2, no S3 or S4, no murmur, click or rub, no peripheral edema and peripheral pulses strong    Diagnostic Test Results:  none     ASSESSMENT/PLAN:       ICD-10-CM    1. Lower respiratory infection (e.g., bronchitis, pneumonia, pneumonitis, pulmonitis) J22 azithromycin (ZITHROMAX) 250 MG tablet   2. Encounter for tobacco use cessation counseling Z71.6 varenicline (CHANTIX STARTING MONTH PAK) 0.5 MG X 11 & 1 MG X 42 tablet     varenicline (CHANTIX) 1 MG tablet       Discussed potential risks and benefits of Chantix and proper usage  Discussed risk of depression and suicidal mood and advised to follow up in clinic and stop taking if developing concerning side effects  Will take for 12 weeks, follow up in clinic if feels will need additional treatment    ALETHA Nunez [facility]

## 2018-07-09 ENCOUNTER — TELEPHONE (OUTPATIENT)
Dept: FAMILY MEDICINE | Facility: CLINIC | Age: 43
End: 2018-07-09

## 2018-07-09 NOTE — LETTER
Please complete the PHQ9  questionnaire and mail back to the clinic in the self addressed envelope provided, thank you.    Enclosed with this letter is a questionnaire about depression for your upcoming visit. Please fill this out and mail back or contact us. We care about you and want to make sure you get the best care possible. If at any time you feel unsafe or have concerns about safety of others please take  immediate action by calling [phone], for Mental Health Crisis phone support 24 hours a day, 365 days per year. As always, you can also go the your local Emergency Room, or call 911 if you have immediate safety concerns.        Yuki Barrett, CNP

## 2018-07-09 NOTE — LETTER
July 20, 2018    Starr Martinez  [address] [facility] [address]      Dear Starr Martinez,     We have tried to contact you about your health, but have been unable to reach you.  Please call us as soon as possible so we can provide you with the best care possible.  We will continue to check in with you throughout the year to complete these items of care, if you are not able to complete these items at this time.  If you would like to complete the missing items for your care, please contact us at [phone].    We recommend the following:  -schedule a PAP SMEAR EXAM which is due.  Please disregard this reminder if you have had this exam elsewhere within the last year.  It would be helpful for us to have a copy of your recent pap smear report in our file so that we can best coordinate your care.  And to follow up on your Depression      Sincerely,     Your Care Team at [facility]    Yuki Barrett CNP/meg

## 2018-07-09 NOTE — LETTER
July 9, 2018    Starr Martinez  [address] [address]    Dear Starr    We care about your health and have reviewed your health plan. We have reviewed your medical conditions, medication list, and lab results and are making recommendations based on this review, to better manage your health.    You are in particular need of attention regarding:  - Your Depression  - Scheduling a Physical with a Cervical Cancer Screening (Pap Smear) age 64 and younger [phone]      Here is a list of Health Maintenance topics that are due now or due soon:  Health Maintenance Due   Topic Date Due     TETANUS IMMUNIZATION (SYSTEM ASSIGNED)  08/23/1992     DEPRESSION ACTION PLAN Q1 YR  08/23/1992     HIV SCREEN (SYSTEM ASSIGNED)  08/23/1992     PAP SCREENING Q3 YR (SYSTEM ASSIGNED)  06/02/2007     PHQ-9 Q6 MONTHS  04/24/2018     We will be calling you in the next couple of weeks to help you schedule any appointments that are needed.  Please call us at [phone] (or use Bench) to address the above recommendations.     Thank you for trusting [facility] and we appreciate the opportunity to serve you.  We look forward to supporting your healthcare needs in the future.    Healthy Regards,    Yuki Barrett, JONY/cm

## 2018-07-09 NOTE — TELEPHONE ENCOUNTER
Panel Management Review      Patient has the following on her problem list:     Depression / Dysthymia review    Measure:  Needs PHQ-9 score of 4 or less during index window.  Administer PHQ-9 and if score is 5 or more, send encounter to provider for next steps.    5 - 7 month window range: ?    PHQ-9 SCORE 10/24/2017   Total Score 0       If PHQ-9 recheck is 5 or more, route to provider for next steps.    Patient is due for:  PHQ9 and DAP      Composite cancer screening  Chart review shows that this patient is due/due soon for the following Pap Smear  Summary:    Patient is due/failing the following:   PAP and PHQ9    Action needed:   Patient needs office visit for pap screen.  Patient needs to do PHQ9.    Type of outreach:    Phone, left message for patient to call back.     Questions for provider review:    None                                                                                                                                    ZOE De Paz MA       Chart routed to closing chart .

## 2019-09-04 ENCOUNTER — TELEPHONE (OUTPATIENT)
Dept: URGENT CARE | Facility: URGENT CARE | Age: 44
End: 2019-09-04

## 2019-09-04 ENCOUNTER — OFFICE VISIT (OUTPATIENT)
Dept: URGENT CARE | Facility: URGENT CARE | Age: 44
End: 2019-09-04
Payer: COMMERCIAL

## 2019-09-04 VITALS
HEART RATE: 65 BPM | WEIGHT: 169 LBS | TEMPERATURE: 97.8 F | BODY MASS INDEX: 32.25 KG/M2 | SYSTOLIC BLOOD PRESSURE: 131 MMHG | OXYGEN SATURATION: 96 % | DIASTOLIC BLOOD PRESSURE: 89 MMHG

## 2019-09-04 DIAGNOSIS — M54.50 ACUTE LOW BACK PAIN WITHOUT SCIATICA, UNSPECIFIED BACK PAIN LATERALITY: ICD-10-CM

## 2019-09-04 DIAGNOSIS — R10.9 FLANK PAIN: Primary | ICD-10-CM

## 2019-09-04 LAB
ALBUMIN SERPL-MCNC: 3.5 G/DL (ref 3.4–5)
ALBUMIN UR-MCNC: NEGATIVE MG/DL
ALP SERPL-CCNC: 83 U/L (ref 40–150)
ALT SERPL W P-5'-P-CCNC: 25 U/L (ref 0–50)
ANION GAP SERPL CALCULATED.3IONS-SCNC: 8 MMOL/L (ref 6–17)
APPEARANCE UR: ABNORMAL
AST SERPL W P-5'-P-CCNC: 21 U/L (ref 0–45)
BASOPHILS # BLD AUTO: 0 10E9/L (ref 0–0.2)
BASOPHILS NFR BLD AUTO: 0.5 %
BILIRUB SERPL-MCNC: 0.7 MG/DL (ref 0.2–1.3)
BILIRUB UR QL STRIP: NEGATIVE
BUN SERPL-MCNC: 12 MG/DL (ref 7–30)
CALCIUM SERPL-MCNC: 9.2 MG/DL (ref 8.5–10.1)
CHLORIDE SERPL-SCNC: 103 MMOL/L (ref 94–109)
CO2 SERPL-SCNC: 32 MMOL/L (ref 20–32)
COLOR UR AUTO: YELLOW
CREAT SERPL-MCNC: 1.1 MG/DL (ref 0.52–1.04)
DIFFERENTIAL METHOD BLD: NORMAL
EOSINOPHIL # BLD AUTO: 0.6 10E9/L (ref 0–0.7)
EOSINOPHIL NFR BLD AUTO: 8.1 %
ERYTHROCYTE [DISTWIDTH] IN BLOOD BY AUTOMATED COUNT: 12.7 % (ref 10–15)
GFR SERPL CREATININE-BSD FRML MDRD: 60 ML/MIN/{1.73_M2}
GLUCOSE SERPL-MCNC: 88 MG/DL (ref 70–99)
GLUCOSE UR STRIP-MCNC: NEGATIVE MG/DL
HCT VFR BLD AUTO: 44.1 % (ref 35–47)
HGB BLD-MCNC: 14.6 G/DL (ref 11.7–15.7)
HGB UR QL STRIP: ABNORMAL
KETONES UR STRIP-MCNC: NEGATIVE MG/DL
LEUKOCYTE ESTERASE UR QL STRIP: NEGATIVE
LYMPHOCYTES # BLD AUTO: 2.2 10E9/L (ref 0.8–5.3)
LYMPHOCYTES NFR BLD AUTO: 29.5 %
MCH RBC QN AUTO: 32 PG (ref 26.5–33)
MCHC RBC AUTO-ENTMCNC: 33.1 G/DL (ref 31.5–36.5)
MCV RBC AUTO: 97 FL (ref 78–100)
MONOCYTES # BLD AUTO: 0.6 10E9/L (ref 0–1.3)
MONOCYTES NFR BLD AUTO: 7.4 %
NEUTROPHILS # BLD AUTO: 4.1 10E9/L (ref 1.6–8.3)
NEUTROPHILS NFR BLD AUTO: 54.5 %
NITRATE UR QL: NEGATIVE
PH UR STRIP: 7 PH (ref 5–7)
PLATELET # BLD AUTO: 250 10E9/L (ref 150–450)
POTASSIUM SERPL-SCNC: 3.7 MMOL/L (ref 3.4–5.3)
PROT SERPL-MCNC: 7 G/DL (ref 6.8–8.8)
RBC # BLD AUTO: 4.56 10E12/L (ref 3.8–5.2)
RBC #/AREA URNS AUTO: NORMAL /HPF
SODIUM SERPL-SCNC: 143 MMOL/L (ref 133–144)
SOURCE: ABNORMAL
SP GR UR STRIP: 1.01 (ref 1–1.03)
UROBILINOGEN UR STRIP-ACNC: 0.2 EU/DL (ref 0.2–1)
WBC # BLD AUTO: 7.5 10E9/L (ref 4–11)
WBC #/AREA URNS AUTO: NORMAL /HPF

## 2019-09-04 PROCEDURE — 99214 OFFICE O/P EST MOD 30 MIN: CPT | Performed by: NURSE PRACTITIONER

## 2019-09-04 PROCEDURE — 36415 COLL VENOUS BLD VENIPUNCTURE: CPT | Performed by: NURSE PRACTITIONER

## 2019-09-04 PROCEDURE — 81001 URINALYSIS AUTO W/SCOPE: CPT | Performed by: NURSE PRACTITIONER

## 2019-09-04 PROCEDURE — 80053 COMPREHEN METABOLIC PANEL: CPT | Performed by: NURSE PRACTITIONER

## 2019-09-04 PROCEDURE — 85025 COMPLETE CBC W/AUTO DIFF WBC: CPT | Performed by: NURSE PRACTITIONER

## 2019-09-04 RX ORDER — TAMSULOSIN HYDROCHLORIDE 0.4 MG/1
0.4 CAPSULE ORAL DAILY
Qty: 28 CAPSULE | Refills: 0 | Status: SHIPPED | OUTPATIENT
Start: 2019-09-04

## 2019-09-04 RX ORDER — CYCLOBENZAPRINE HCL 5 MG
5-10 TABLET ORAL 3 TIMES DAILY PRN
Qty: 30 TABLET | Refills: 0 | Status: SHIPPED | OUTPATIENT
Start: 2019-09-04

## 2019-09-04 ASSESSMENT — ENCOUNTER SYMPTOMS
HEMATURIA: 0
DYSURIA: 0
BACK PAIN: 1
ABDOMINAL PAIN: 1
FEVER: 0
VOMITING: 0
MYALGIAS: 1
NAUSEA: 0
FLANK PAIN: 1

## 2019-09-04 NOTE — LETTER
[facility]  [address]  [facility] [address]      September 4, 2019    RE:  Starr Martinez                                                                                                                                                       [address] [facility] [address]            To whom it may concern:    Starr Martinez is under my professional care for an acute illness. Please excuse her from missed work today 9/4/19.           Sincerely,        Ajit Hernandez CNP    [facility]

## 2019-09-04 NOTE — TELEPHONE ENCOUNTER
Reviewed message with patient. And did some education on what these tests mean . Told her told her to follow up with PCP if not better in 1-2 weeks.  Sandra Whitt RN

## 2019-09-04 NOTE — TELEPHONE ENCOUNTER
This writer attempted to contact Starr on 09/04/19      Reason for call results and left message.      If patient calls back:   Registered Nurse called. Follow Triage Call workflow        Sandra Whitt, Ajit Power CNP [facility]             Please inform Starr that her comprehensive metabolic panel drawn at urgent care came back with an mildly elevated creatinine and a decreased GFR (filtration rate). This could be due to some dehydration. Recommend to push fluids and continue the plan of care at the time of her appointment and if symptoms do not improved as discussed in 1-2 weeks then follow up with primary care provider.     Thank You,   Ajit Hernandez, JONY

## 2019-09-04 NOTE — TELEPHONE ENCOUNTER
Patient returned call    Best number to reach caller: Cell number on file:    Telephone Information:   Mobile [phone]       Is it ok to leave a detailed message: YES

## 2019-09-04 NOTE — PROGRESS NOTES
SUBJECTIVE:   Starr Martinez is a 44 year old female presenting with a chief complaint of   Chief Complaint   Patient presents with     Kidney Problem     Patient is here for kidney infection       She is an established patient of [facility].    UTI    Onset of symptoms was 8day(s).  Course of illness is worsening  Severity moderate  Current and associated symptoms back pain  Treatment and measures tried Uristat (Pyridium) and Antibiotics  Predisposing factors include history of frequent UTI's  Patient denies temperature > 101 degrees F., vaginal discharge, vaginal odor and vaginal itching    Was seen 8/28/19 at a [facility] and was started on cephalexin 500 three times a day for 7 days and pyridium for a UTI. States did get a little better but now seems to be worse again.          Review of Systems   Constitutional: Negative for fever.   Gastrointestinal: Positive for abdominal pain (Hx celiac so always has pain, nothing worse than baseline). Negative for nausea and vomiting.   Genitourinary: Positive for flank pain. Negative for dysuria, hematuria, vaginal bleeding and vaginal discharge.   Musculoskeletal: Positive for back pain and myalgias.       Past Medical History:   Diagnosis Date     Unspecified hypothyroidism      Unspecified otitis media      Family History   Problem Relation Age of Onset     Arthritis Paternal Grandmother      Eye Disorder Paternal Grandmother      Osteoporosis Paternal Grandmother      Diabetes Paternal Grandfather      Thyroid Disease Mother      Diabetes Father      Current Outpatient Medications   Medication Sig Dispense Refill     azithromycin (ZITHROMAX) 250 MG tablet Two tablets first day, then one tablet daily for four days. 6 tablet 0     cholecalciferol (VITAMIN D3) 53651 UNITS capsule Take 1 capsule (50,000 Units) by mouth once a week 8 capsule 0     cyclobenzaprine (FLEXERIL) 5 MG tablet Take 1-2 tablets (5-10 mg) by mouth 3 times daily as needed for muscle spasms 30 tablet 0      IBUPROFEN PO Take 200 mg by mouth every 4 hours as needed for moderate pain       tamsulosin (FLOMAX) 0.4 MG capsule Take 1 capsule (0.4 mg) by mouth daily 28 capsule 0     varenicline (CHANTIX STARTING MONTH PAK) 0.5 MG X 11 & 1 MG X 42 tablet Take 0.5 mg tab daily for 3 days, then 0.5 mg tab twice daily for 4 days, then 1 mg twice daily. 53 tablet 0     varenicline (CHANTIX) 1 MG tablet Take 1 tablet (1 mg) by mouth 2 times daily 60 tablet 2     Social History     Tobacco Use     Smoking status: Current Every Day Smoker     Packs/day: 0.50     Smokeless tobacco: Never Used   Substance Use Topics     Alcohol use: Yes     Comment: rare once a month       OBJECTIVE  /89 (BP Location: Left arm, Patient Position: Chair, Cuff Size: Adult Regular)   Pulse 65   Temp 97.8  F (36.6  C) (Oral)   Wt 76.7 kg (169 lb)   SpO2 96%   BMI 32.25 kg/m      Physical Exam   Constitutional: She is oriented to person, place, and time. She appears well-developed and well-nourished. She is cooperative.   Cardiovascular: Normal rate, regular rhythm and normal heart sounds.   Pulmonary/Chest: Effort normal and breath sounds normal.   Abdominal: Soft. Bowel sounds are normal. There is tenderness in the left lower quadrant. There is no CVA tenderness.   Neurological: She is alert and oriented to person, place, and time.   Skin: Skin is warm, dry and intact.   Nursing note and vitals reviewed.      Labs:  Results for orders placed or performed in visit on 09/04/19 (from the past 24 hour(s))   *UA reflex to Microscopic and Culture ([place] and [facility] (except [place] and [place])   Result Value Ref Range    Color Urine Yellow     Appearance Urine Slightly Cloudy     Glucose Urine Negative NEG^Negative mg/dL    Bilirubin Urine Negative NEG^Negative    Ketones Urine Negative NEG^Negative mg/dL    Specific Gravity Urine 1.010 1.003 - 1.035    Blood Urine Trace (A) NEG^Negative    pH Urine 7.0 5.0 - 7.0 pH    Protein  Albumin Urine Negative NEG^Negative mg/dL    Urobilinogen Urine 0.2 0.2 - 1.0 EU/dL    Nitrite Urine Negative NEG^Negative    Leukocyte Esterase Urine Negative NEG^Negative    Source Midstream Urine    Urine Microscopic   Result Value Ref Range    WBC Urine 0 - 5 OTO5^0 - 5 /HPF    RBC Urine O - 2 OTO2^O - 2 /HPF   CBC with platelets and differential   Result Value Ref Range    WBC 7.5 4.0 - 11.0 10e9/L    RBC Count 4.56 3.8 - 5.2 10e12/L    Hemoglobin 14.6 11.7 - 15.7 g/dL    Hematocrit 44.1 35.0 - 47.0 %    MCV 97 78 - 100 fl    MCH 32.0 26.5 - 33.0 pg    MCHC 33.1 31.5 - 36.5 g/dL    RDW 12.7 10.0 - 15.0 %    Platelet Count 250 150 - 450 10e9/L    % Neutrophils 54.5 %    % Lymphocytes 29.5 %    % Monocytes 7.4 %    % Eosinophils 8.1 %    % Basophils 0.5 %    Absolute Neutrophil 4.1 1.6 - 8.3 10e9/L    Absolute Lymphocytes 2.2 0.8 - 5.3 10e9/L    Absolute Monocytes 0.6 0.0 - 1.3 10e9/L    Absolute Eosinophils 0.6 0.0 - 0.7 10e9/L    Absolute Basophils 0.0 0.0 - 0.2 10e9/L    Diff Method Automated Method        X-Ray was not done.    ASSESSMENT:      ICD-10-CM    1. Flank pain R10.9 CBC with platelets and differential     Comprehensive metabolic panel (BMP + Alb, Alk Phos, ALT, AST, Total. Bili, TP)     *UA reflex to Microscopic and Culture ([place] and [facility] (except [place] and [place])     Urine Microscopic     tamsulosin (FLOMAX) 0.4 MG capsule   2. Acute low back pain without sciatica, unspecified back pain laterality M54.5 cyclobenzaprine (FLEXERIL) 5 MG tablet        Medical Decision Making:    Differential Diagnosis:  UTI: UTI, Pyelonephritis, Kidney Stone, Interstitial Cystitis, musculoskeletal pain    Serious Comorbid Conditions:  Adult:  None    PLAN:  Discussed with patient that CBC is unremarkable. UA does show some trace blood, but is otherwise normal. CMP is pending. Favor a musculoskeletal injury v kidney stones. Recommend finishing antibiotics as previously prescribed for UTI. Will  start her on flomax daily for up to 4 weeks. Will also do cyclobenzaprine to help with back pain if more musculoskeletal related. Additional symptomatic treatment recommendations discussed. Education was added to AVS. Patient and significant other were agreeable to plan and verbalized understanding.     Followup:    If not improving or if condition worsens, follow up with your Primary Care Provider    Patient Instructions   Finish antibiotics as prescribed  tamsulosin daily for up to 4 weeks  Cyclobenzaprine 1-2 tablets three times as needed for back pain  Push fluids  Plenty of rest  Tylenol and ibuprofen to help with pain   Can alternate heat and ice to help with pain

## 2019-09-04 NOTE — PATIENT INSTRUCTIONS
Finish antibiotics as prescribed  tamsulosin daily for up to 4 weeks  Cyclobenzaprine 1-2 tablets three times as needed for back pain  Push fluids  Plenty of rest  Tylenol and ibuprofen to help with pain   Can alternate heat and ice to help with pain

## 2019-10-25 ENCOUNTER — OFFICE VISIT (OUTPATIENT)
Dept: FAMILY MEDICINE | Facility: CLINIC | Age: 44
End: 2019-10-25
Payer: COMMERCIAL

## 2019-10-25 ENCOUNTER — TELEPHONE (OUTPATIENT)
Dept: FAMILY MEDICINE | Facility: CLINIC | Age: 44
End: 2019-10-25

## 2019-10-25 VITALS
HEART RATE: 69 BPM | WEIGHT: 170 LBS | DIASTOLIC BLOOD PRESSURE: 78 MMHG | OXYGEN SATURATION: 96 % | BODY MASS INDEX: 33.38 KG/M2 | TEMPERATURE: 98 F | SYSTOLIC BLOOD PRESSURE: 111 MMHG | HEIGHT: 60 IN

## 2019-10-25 DIAGNOSIS — Z87.448 HISTORY OF PROLAPSE OF BLADDER: ICD-10-CM

## 2019-10-25 DIAGNOSIS — M53.3 SACROILIAC JOINT PAIN: ICD-10-CM

## 2019-10-25 DIAGNOSIS — R35.0 URINARY FREQUENCY: ICD-10-CM

## 2019-10-25 DIAGNOSIS — R30.0 DYSURIA: Primary | ICD-10-CM

## 2019-10-25 LAB
ALBUMIN UR-MCNC: NEGATIVE MG/DL
APPEARANCE UR: CLEAR
BACTERIA #/AREA URNS HPF: ABNORMAL /HPF
BILIRUB UR QL STRIP: NEGATIVE
COLOR UR AUTO: YELLOW
GLUCOSE UR STRIP-MCNC: NEGATIVE MG/DL
HGB UR QL STRIP: ABNORMAL
KETONES UR STRIP-MCNC: NEGATIVE MG/DL
LEUKOCYTE ESTERASE UR QL STRIP: NEGATIVE
NITRATE UR QL: NEGATIVE
PH UR STRIP: 7 PH (ref 5–7)
RBC #/AREA URNS AUTO: ABNORMAL /HPF
SOURCE: ABNORMAL
SP GR UR STRIP: <=1.005 (ref 1–1.03)
UROBILINOGEN UR STRIP-ACNC: 0.2 EU/DL (ref 0.2–1)
WBC #/AREA URNS AUTO: ABNORMAL /HPF

## 2019-10-25 PROCEDURE — 99214 OFFICE O/P EST MOD 30 MIN: CPT | Performed by: NURSE PRACTITIONER

## 2019-10-25 PROCEDURE — 81001 URINALYSIS AUTO W/SCOPE: CPT | Performed by: NURSE PRACTITIONER

## 2019-10-25 RX ORDER — IBUPROFEN 600 MG/1
600 TABLET, FILM COATED ORAL EVERY 6 HOURS PRN
Qty: 30 TABLET | Refills: 1 | Status: SHIPPED | OUTPATIENT
Start: 2019-10-25 | End: 2019-10-25

## 2019-10-25 RX ORDER — IBUPROFEN 600 MG/1
600 TABLET, FILM COATED ORAL EVERY 6 HOURS PRN
Qty: 30 TABLET | Refills: 1 | Status: SHIPPED | OUTPATIENT
Start: 2019-10-25

## 2019-10-25 ASSESSMENT — MIFFLIN-ST. JEOR: SCORE: 1329.67

## 2019-10-25 ASSESSMENT — PAIN SCALES - GENERAL: PAINLEVEL: MODERATE PAIN (4)

## 2019-10-25 NOTE — TELEPHONE ENCOUNTER
Panel Management Review      Patient has the following on her problem list:     Depression / Dysthymia review    Measure:  Needs PHQ-9 score of 4 or less during index window.  Administer PHQ-9 and if score is 5 or more, send encounter to provider for next steps.    5 - 7 month window range: ?    PHQ-9 SCORE 10/24/2017   PHQ-9 Total Score 0       If PHQ-9 recheck is 5 or more, route to provider for next steps.    Patient is due for:  PHQ9      Composite cancer screening  Chart review shows that this patient is due/due soon for the following Pap Smear  Summary:    Patient is due/failing the following:   PAP and PHQ9    Action needed:   Patient in office and discussed HM items due and declined pap screening and PHQ9    Type of outreach:    see above    Questions for provider review:    None                                                                                                                                    ZOE De Paz MA       Chart routed to closing encounter .

## 2019-10-25 NOTE — PROGRESS NOTES
"Ibuprofen 600 mg, Provider approved Rx but \"transmission to pharmacy failed\" message received in Epic.   Rx re-sent now as \"provider sign\".    Candi Purvis RN  [facility]      "

## 2019-10-25 NOTE — PROGRESS NOTES
"Subjective     Starr Martinez is a 44 year old female who presents to clinic today for the following health issues:    HPI     She reports a history of UTI and kidney stone  She was treated for a UTI late August which she reports resolved with antibiotics    She presents today with 1 week history of right flank pain  (She points to right low back and SI joint as area of pain)  Pain is intermittent. Worse with activity  Denies dysuria, hematuria, urgency  She does have a history of urinary frequency and prolapsed bladder  She is interested in a urology consult for this  She denies fever, chills, nausea, vomiting  She has tried taking tylenol for the pain without relief    She reports having a \"large cyst\" removed from her back approximately 10 days ago  This was done at [facility]  She was originally prescribed a cephalosporin but it caused nausea so it was switched to doxycycline  She stopped taking the doxycyline because she thinks it caused her flank pain        Patient Active Problem List   Diagnosis     Irritable bowel syndrome with both constipation and diarrhea     Abdominal pain, left lower quadrant     Celiac disease     Fibromyalgia     Chronic fatigue     Tobacco use     Depression with anxiety     Vitamin D deficiency     Past Surgical History:   Procedure Laterality Date     CHOLECYSTECTOMY       GYN SURGERY      uterine \"burned\"     SOFT TISSUE SURGERY      breast reduction     SURGICAL HISTORY OF -       cholecystectomy       Social History     Tobacco Use     Smoking status: Current Every Day Smoker     Packs/day: 0.50     Smokeless tobacco: Never Used   Substance Use Topics     Alcohol use: Yes     Comment: rare once a month     Family History   Problem Relation Age of Onset     Arthritis Paternal Grandmother      Eye Disorder Paternal Grandmother      Osteoporosis Paternal Grandmother      Diabetes Paternal Grandfather      Thyroid Disease Mother      Diabetes Father          Current " "Outpatient Medications   Medication Sig Dispense Refill     ibuprofen (ADVIL/MOTRIN) 600 MG tablet Take 1 tablet (600 mg) by mouth every 6 hours as needed for moderate pain 30 tablet 1     IBUPROFEN PO Take 200 mg by mouth every 4 hours as needed for moderate pain       azithromycin (ZITHROMAX) 250 MG tablet Two tablets first day, then one tablet daily for four days. (Patient not taking: Reported on 10/25/2019) 6 tablet 0     cholecalciferol (VITAMIN D3) 29813 UNITS capsule Take 1 capsule (50,000 Units) by mouth once a week (Patient not taking: Reported on 10/25/2019) 8 capsule 0     cyclobenzaprine (FLEXERIL) 5 MG tablet Take 1-2 tablets (5-10 mg) by mouth 3 times daily as needed for muscle spasms (Patient not taking: Reported on 10/25/2019) 30 tablet 0     tamsulosin (FLOMAX) 0.4 MG capsule Take 1 capsule (0.4 mg) by mouth daily 28 capsule 0     varenicline (CHANTIX STARTING MONTH PAK) 0.5 MG X 11 & 1 MG X 42 tablet Take 0.5 mg tab daily for 3 days, then 0.5 mg tab twice daily for 4 days, then 1 mg twice daily. (Patient not taking: Reported on 10/25/2019) 53 tablet 0     varenicline (CHANTIX) 1 MG tablet Take 1 tablet (1 mg) by mouth 2 times daily (Patient not taking: Reported on 10/25/2019) 60 tablet 2         Reviewed and updated as needed this visit by Provider         Review of Systems   ROS COMP: Constitutional, HEENT, cardiovascular, pulmonary, gi and gu systems are negative, except as otherwise noted.      Objective    /78 (BP Location: Right arm, Patient Position: Chair, Cuff Size: Adult Regular)   Pulse 69   Temp 98  F (36.7  C) (Oral)   Ht 1.511 m (4' 11.5\")   Wt 77.1 kg (170 lb)   LMP 10/19/2019   SpO2 96%   Breastfeeding? No   BMI 33.76 kg/m    Body mass index is 33.76 kg/m .  Physical Exam   GENERAL: healthy, alert and no distress  RESP: lungs clear to auscultation - no rales, rhonchi or wheezes  CV: regular rate and rhythm, normal S1 S2, no S3 or S4, no murmur, click or rub, no " peripheral edema and peripheral pulses strong  MS: No tenderness lumbar spinous processes. Tenderness right low back. No point tenderness. Tenderness right SI joint region. Gait intact. Lower extremity strength intact  SKIN: Surgical incision left upper back, open wound, some purulent drainage, no surrounding erythema or warmth  NEURO: Normal strength and tone, mentation intact and speech normal  BACK: no CVA tenderness, no paralumbar tenderness    Diagnostic Test Results:  Labs reviewed in Epic  Results for orders placed or performed in visit on 10/25/19 (from the past 24 hour(s))   UA reflex to Microscopic and Culture   Result Value Ref Range    Color Urine Yellow     Appearance Urine Clear     Glucose Urine Negative NEG^Negative mg/dL    Bilirubin Urine Negative NEG^Negative    Ketones Urine Negative NEG^Negative mg/dL    Specific Gravity Urine <=1.005 1.003 - 1.035    Blood Urine Small (A) NEG^Negative    pH Urine 7.0 5.0 - 7.0 pH    Protein Albumin Urine Negative NEG^Negative mg/dL    Urobilinogen Urine 0.2 0.2 - 1.0 EU/dL    Nitrite Urine Negative NEG^Negative    Leukocyte Esterase Urine Negative NEG^Negative    Source Midstream Urine    Urine Microscopic   Result Value Ref Range    WBC Urine 0 - 5 OTO5^0 - 5 /HPF    RBC Urine O - 2 OTO2^O - 2 /HPF    Bacteria Urine Few (A) NEG^Negative /HPF           Assessment & Plan       ICD-10-CM    1. Dysuria R30.0 UA reflex to Microscopic and Culture     Urine Microscopic   2. Sacroiliac joint pain M53.3 ibuprofen (ADVIL/MOTRIN) 600 MG tablet   3. Urinary frequency R35.0 UROLOGY ADULT REFERRAL   4. History of prolapse of bladder Z87.448 UROLOGY ADULT REFERRAL       This does not appear to represent a kidney stone or pyelonephritis. No signs of infection. No true flank pain on exam. Pain is more so located over SI joint. I recommend treating with NSAIDs, heat/ice and rest. If not improving, consider x-ray and possible referral to ortho for injection  I did place a  referral to Dr. Reyes for chronic urinary complains. When I handed her the AVS she asked if  would be completing FMLA paperwork. I then inquired what she had FMLA paperwork for. (She does not have the paperwork with her today). She stated she needs FMLA for chronic abdominal pain, fibromaylgia and celiac. I did explain that  would not complete this paperwork. I recommend she find a primary care provider to establish care with. Recommend scheduling visit next week to establish care and at that time could also follow up on SI pain    Patient Instructions   I do not think the pain is coming from your kidneys  I think it is coming from your low back and SI joint  I recommend taking ibuprofen 600 mg (with food) every 6 hours for the next 3 days. Take it on a schedule for the anti-inflammatory effect. After 3 days, you can take as needed for pain  If not improving by Monday, please let me know and we can discuss further    You can schedule with our urologist, , who specializes in female urology. You can call  [phone]  To schedule    ALETHA Nunez [facility]

## 2019-10-25 NOTE — PATIENT INSTRUCTIONS
I do not think the pain is coming from your kidneys  I think it is coming from your low back and SI joint  I recommend taking ibuprofen 600 mg (with food) every 6 hours for the next 3 days. Take it on a schedule for the anti-inflammatory effect. After 3 days, you can take as needed for pain  If not improving by Monday, please let me know and we can discuss further    You can schedule with our urologist, , who specializes in female urology. You can call  [phone]  To schedule

## 2020-07-21 ENCOUNTER — NURSE TRIAGE (OUTPATIENT)
Dept: NURSING | Facility: CLINIC | Age: 45
End: 2020-07-21

## 2020-07-21 NOTE — TELEPHONE ENCOUNTER
"    Additional Information    Negative: Looks like a broken bone or dislocated joint (e.g., crooked or deformed)    Negative: Sounds like a life-threatening emergency to the triager    Negative: Followed a hip injury    Negative: Followed a knee injury    Negative: Followed an ankle or foot injury    Negative: Back pain radiating (shooting) into leg(s)    Negative: Foot pain is the main symptom    Negative: Ankle pain is the main symptom    Negative: Knee pain is the main symptom    Negative: Leg swelling is the main symptom    Negative: Chest pain    Negative: Difficulty breathing    Negative: Entire foot is cool or blue in comparison to other side    Negative: Unable to walk    Thigh or calf pain in only one leg and present > 1 hour    Answer Assessment - Initial Assessment Questions  1. ONSET: \"When did the pain start?\"       Sunday  2. LOCATION: \"Where is the pain located?\"       Right calf  3. PAIN: \"How bad is the pain?\"    (Scale 1-10; or mild, moderate, severe)    -  MILD (1-3): doesn't interfere with normal activities     -  MODERATE (4-7): interferes with normal activities (e.g., work or school) or awakens from sleep, limping     -  SEVERE (8-10): excruciating pain, unable to do any normal activities, unable to walk      moderate  4. WORK OR EXERCISE: \"Has there been any recent work or exercise that involved this part of the body?\"       Recent injury- fall 3 weeks ago  5. CAUSE: \"What do you think is causing the leg pain?\"      Thinks is having problems with veins as they are bulging and has some hard bumps in those bulging veins  6. OTHER SYMPTOMS: \"Do you have any other symptoms?\" (e.g., chest pain, back pain, breathing difficulty, swelling, rash, fever, numbness, weakness)      Had heaviness in chest.  Was seen in ED 7/20, had right leg ultrasound and chest CT, negative for clots  7. PREGNANCY: \"Is there any chance you are pregnant?\" \"When was your last menstrual period?\"      N/A    Protocols used: " LEG PAIN-A-[place]

## 2020-08-15 ENCOUNTER — NURSE TRIAGE (OUTPATIENT)
Dept: NURSING | Facility: CLINIC | Age: 45
End: 2020-08-15

## 2020-08-15 ENCOUNTER — HOSPITAL ENCOUNTER (EMERGENCY)
Facility: CLINIC | Age: 45
Discharge: HOME | End: 2020-08-15
Attending: EMERGENCY MEDICINE | Admitting: EMERGENCY MEDICINE
Payer: COMMERCIAL

## 2020-08-15 ENCOUNTER — APPOINTMENT (OUTPATIENT)
Dept: GENERAL RADIOLOGY | Facility: CLINIC | Age: 45
End: 2020-08-15
Attending: EMERGENCY MEDICINE
Payer: COMMERCIAL

## 2020-08-15 VITALS
TEMPERATURE: 98.2 F | SYSTOLIC BLOOD PRESSURE: 121 MMHG | DIASTOLIC BLOOD PRESSURE: 78 MMHG | RESPIRATION RATE: 18 BRPM | OXYGEN SATURATION: 99 % | HEART RATE: 84 BPM

## 2020-08-15 DIAGNOSIS — S93.491A SPRAIN OF ANTERIOR TALOFIBULAR LIGAMENT OF RIGHT ANKLE, INITIAL ENCOUNTER: ICD-10-CM

## 2020-08-15 PROCEDURE — 99284 EMERGENCY DEPT VISIT MOD MDM: CPT | Mod: Z6 | Performed by: EMERGENCY MEDICINE

## 2020-08-15 PROCEDURE — 25000132 ZZH RX MED GY IP 250 OP 250 PS 637: Performed by: EMERGENCY MEDICINE

## 2020-08-15 PROCEDURE — 73610 X-RAY EXAM OF ANKLE: CPT | Mod: RT

## 2020-08-15 PROCEDURE — 99283 EMERGENCY DEPT VISIT LOW MDM: CPT

## 2020-08-15 RX ORDER — NAPROXEN 500 MG/1
500 TABLET ORAL ONCE
Status: COMPLETED | OUTPATIENT
Start: 2020-08-15 | End: 2020-08-15

## 2020-08-15 RX ORDER — HYDROCODONE BITARTRATE AND ACETAMINOPHEN 5; 325 MG/1; MG/1
1 TABLET ORAL ONCE
Status: COMPLETED | OUTPATIENT
Start: 2020-08-15 | End: 2020-08-15

## 2020-08-15 RX ADMIN — NAPROXEN 500 MG: 500 TABLET ORAL at 22:17

## 2020-08-15 RX ADMIN — HYDROCODONE BITARTRATE AND ACETAMINOPHEN 1 TABLET: 5; 325 TABLET ORAL at 22:16

## 2020-08-15 NOTE — ED AVS SNAPSHOT
[facility], [place], Emergency Department  [address] [address]  Phone:  [phone]                                    Starr Martinez   MRN: [id number]    Department:  [facility], Emergency Department   Date of Visit:  8/15/2020           After Visit Summary Signature Page    I have received my discharge instructions, and my questions have been answered. I have discussed any challenges I see with this plan with the nurse or doctor.    ..........................................................................................................................................  Patient/Patient Representative Signature      ..........................................................................................................................................  Patient Representative Print Name and Relationship to Patient    ..................................................               ................................................  Date                                   Time    ..........................................................................................................................................  Reviewed by Signature/Title    ...................................................              ..............................................  Date                                               Time          22EPIC Rev 08/18

## 2020-08-16 NOTE — TELEPHONE ENCOUNTER
Pt was outside with her dog who was on a tie out.  Patient was attempting to brush the dog and did not have her foot on the leash close enough to the dog and the leash wrapped around her foot and caused her to fall. When she fell her right foot twisted and she felt a pop.  +Swelling on the foot and ankle, burning sensation, some bruising noted.     Pt has tried Ice, elevation, ibuprofen.     Pt unable to put pressure on foot to walk. Able to stand if not putting full pressure on foot. When attempting to walk unable to as she can't put that much weight on her foot, needs support.    RN advised of protocol recommendation to go to ED for further evaluation.  Patient stated understanding and will go into the ED for evaluation.     Pt states she was told by ED that she can bring her boyfriend with as long as no one has COVID sx. RN advised to follow their recommendation as they have the most up to date information for their location.     Patient stated understanding and will go to ED for further evaluation.     Tasia Patten RN 08/15/20 8:31 PM  [facility] Nurse Advisor          Reason for Disposition    Can't stand (bear weight) or walk    Additional Information    Negative: Serious injury with multiple fractures    Negative: [1] Major bleeding (e.g., actively dripping or spurting) AND [2] can't be stopped    Negative: Amputation    Negative: Looks like a dislocated joint (very crooked or deformed)    Negative: Sounds like a life-threatening emergency to the triager    Negative: Wound looks infected    Negative: Caused by an animal bite    Negative: Caused by a human bite    Negative: Puncture wound of foot    Negative: Toe injury is main concern    Negative: Cast problems or questions    Negative: Bullet wound, stabbed by knife, or other serious penetrating wound    Negative: Skin is split open or gaping (or length > 1/2 inch or 12 mm)    Negative: [1] Bleeding AND [2] won't stop after 10 minutes of direct  pressure (using correct technique)    Negative: [1] Dirt in the wound AND [2] not removed with 15 minutes of scrubbing    Protocols used: FOOT AND ANKLE INJURY-A-AH

## 2020-08-16 NOTE — ED TRIAGE NOTES
Pt arrives in a wheelchair with complaints of R foot pain after a fall. She was caught in her dog's leash, fell down, and she heard it pop. She is unable to bear weight on it. She says she can kind of stand but not walk. She describes it as swollen and burning.

## 2020-08-16 NOTE — ED PROVIDER NOTES
"    [place] EMERGENCY DEPARTMENT ([facility])  8/15/20    History     Chief Complaint   Patient presents with     Foot Pain     HPI  Starr Martinez is a 44 year old female with a past medical history of fibromyalgia, Bell's palsy, celiac disease, and bilateral sciatica who presents to the Emergency Department for evaluation of right foot pain after a fall.  Patient states that she was walking her dog this evening and fell over the dog leash that was wrapped around her ankle.  She felt instant discomfort and thought she heard a pop.  She denies any other injuries and states that she feels like she cannot bear weight on that leg.    I have reviewed the Medications, Allergies, Past Medical and Surgical History, and Social History in the [employer] system.  PAST MEDICAL HISTORY:   Past Medical History:   Diagnosis Date     Unspecified hypothyroidism      Unspecified otitis media        PAST SURGICAL HISTORY:   Past Surgical History:   Procedure Laterality Date     CHOLECYSTECTOMY       GYN SURGERY      uterine \"burned\"     SOFT TISSUE SURGERY      breast reduction     SURGICAL HISTORY OF -       cholecystectomy       Past medical history, past surgical history, medications, and allergies were reviewed with the patient. Additional pertinent items: None    FAMILY HISTORY:   Family History   Problem Relation Age of Onset     Arthritis Paternal Grandmother      Eye Disorder Paternal Grandmother      Osteoporosis Paternal Grandmother      Diabetes Paternal Grandfather      Thyroid Disease Mother      Diabetes Father        SOCIAL HISTORY:   Social History     Tobacco Use     Smoking status: Current Every Day Smoker     Packs/day: 0.50     Smokeless tobacco: Never Used   Substance Use Topics     Alcohol use: Yes     Comment: rare once a month     Social history was reviewed with the patient. Additional pertinent items: None      Discharge Medication List as of 8/15/2020 11:30 PM      CONTINUE these medications which have NOT " CHANGED    Details   azithromycin (ZITHROMAX) 250 MG tablet Two tablets first day, then one tablet daily for four days., Disp-6 tablet, R-0, E-Prescribe      cholecalciferol (VITAMIN D3) 39893 UNITS capsule Take 1 capsule (50,000 Units) by mouth once a week, Disp-8 capsule, R-0, E-Prescribe      cyclobenzaprine (FLEXERIL) 5 MG tablet Take 1-2 tablets (5-10 mg) by mouth 3 times daily as needed for muscle spasms, Disp-30 tablet, R-0, E-Prescribe      !! ibuprofen (ADVIL/MOTRIN) 600 MG tablet Take 1 tablet (600 mg) by mouth every 6 hours as needed for moderate pain, Disp-30 tablet,R-1, E-Prescribe      !! IBUPROFEN PO Take 200 mg by mouth every 4 hours as needed for moderate pain, Historical      tamsulosin (FLOMAX) 0.4 MG capsule Take 1 capsule (0.4 mg) by mouth daily, Disp-28 capsule, R-0, E-Prescribe      varenicline (CHANTIX STARTING MONTH PAK) 0.5 MG X 11 & 1 MG X 42 tablet Take 0.5 mg tab daily for 3 days, then 0.5 mg tab twice daily for 4 days, then 1 mg twice daily., Disp-53 tablet, R-0, E-Prescribe      varenicline (CHANTIX) 1 MG tablet Take 1 tablet (1 mg) by mouth 2 times daily, Disp-60 tablet, R-2, E-PrescribeProfile Rx: patient will contact pharmacy when needed       !! - Potential duplicate medications found. Please discuss with provider.             Allergies   Allergen Reactions     Codeine Palpitations     Hydrocodone-Acetaminophen Nausea and Vomiting        Review of Systems  A complete review of systems was performed with pertinent positives and negatives noted in the HPI, and all other systems negative.    Physical Exam   BP: (!) 143/84  Pulse: 84  Temp: 98.2  F (36.8  C)  Resp: 18  SpO2: 97 %      Physical Exam  Constitutional:       General: She is not in acute distress.     Appearance: She is well-developed. She is not ill-appearing, toxic-appearing or diaphoretic.      Comments: Comfortably resting, lying in bed, NAD, nondiaphoretic, lucid, fully conversant, no  respiratory distress, alert and  oriented.     HENT:      Head: Normocephalic and atraumatic.   Eyes:      General: No scleral icterus.  Neck:      Musculoskeletal: Normal range of motion and neck supple.   Cardiovascular:      Rate and Rhythm: Normal rate.   Pulmonary:      Effort: Pulmonary effort is normal.   Musculoskeletal:        Feet:    Skin:     General: Skin is warm and dry.      Coloration: Skin is not pale.      Findings: No erythema or rash.   Neurological:      Mental Status: She is alert and oriented to person, place, and time.         ED Course        Procedures           Results for orders placed or performed during the hospital encounter of 08/15/20   Ankle XR, G/E 3 views, right     Status: None    Narrative    EXAM: XR ANKLE RT G/E 3 VW  LOCATION: [facility]  DATE/TIME: 8/15/2020 9:57 PM    INDICATION: Injury with pain.  COMPARISON: None.      Impression    IMPRESSION: There is a lucency involving the medial talar dome compatible with osteochondral defect, age undetermined. Small calcaneal spurs. Mild soft tissue swelling laterally.                     No results found for this or any previous visit (from the past 24 hour(s)).  Medications   naproxen (NAPROSYN) tablet 500 mg (500 mg Oral Given 8/15/20 2212)   HYDROcodone-acetaminophen (NORCO) 5-325 MG per tablet 1 tablet (1 tablet Oral Given 8/15/20 1706)             Assessments & Plan (with Medical Decision Making)   This is a 44-year-old female patient coming into the emergency room with complaints of right ankle pain after twisting it around the dog leash.  On physical exam her chief complaint is her right ankle.  There is no significant signs of swelling.  Her visible tenderness is over the ATFL.  There is no significant swelling over this area but that is where she is the most tender.  The rest of her ankle is stable with no signs of bony trauma.  There is no proximal fibular tenderness.  Patient has difficulty bearing weight with pain in that location.  X-ray  "confirms that there is no signs of acute fracture.  There is a concern for a calcaneal spur but no signs of acute trauma.  In a location where the patient is complaining this is not in the necessary region of the findings on imaging.  Patient was placed in a Ace wrap and provided with a walker.  She was provided with multiple options including a cam walker boot and crutches but she preferred to use a walker and Ace wrap.  My recommendation is to use Motrin and Tylenol for discomfort and to follow-up with her primary care physician if things are not improving she should probably see an orthopedic surgeon.    Pt was discharged home/self-care.  PT was provided written discharge instructions. Additionally verbal instructions were given and discussed with patient.  PT was asked to return to the ED immediately for any new or concerning symptoms.  Pt was in agreement, endorsed understanding, and questions were answered.     I have reviewed the nursing notes.    I have reviewed the findings, diagnosis, plan and need for follow up with the patient.    Discharge Medication List as of 8/15/2020 11:30 PM          Final diagnoses:   Sprain of anterior talofibular ligament of right ankle, initial encounter     \"This dictation was performed with the assistance of voice recognition software and may contain inadvertant transcription  errors,  omissions and/or  inadvertent word substitution.\" --Lazarus Tan MD     8/15/2020   [facility], [place], EMERGENCY DEPARTMENT     Lazarus Tan MD  08/16/20 4982    "

## 2022-02-15 ENCOUNTER — PRE VISIT (OUTPATIENT)
Dept: UROLOGY | Facility: CLINIC | Age: 47
End: 2022-02-15
Payer: COMMERCIAL

## 2022-02-15 NOTE — TELEPHONE ENCOUNTER
MEDICAL RECORDS REQUEST   [facility]  Urology Clinic  [address] [place] [address]  PHONE: [phone]  Fax: [phone]        FUTURE VISIT INFORMATION                                                   Starr JUAREZ Juan, : 1975 scheduled for future visit at [facility]    APPOINTMENT INFORMATION:    Date: 2022    Provider:  Reza Pantoja MD    Reason for Visit/Diagnosis: Prolapse    REFERRAL INFORMATION:    Referring provider:  Yuki Barrett APRN CNP    Referring providers clinic:  [facility] FP/IM/PEDS    RECORDS REQUESTED FOR VISIT                                                     NOTES  STATUS/DETAILS   OFFICE NOTE from referring provider  yes, 10/25/2019 -- Yuki Barrett APRN CNP in [facility] FP/IM/PEDS   MEDICATION LIST  yes   LABS     URINALYSIS (UA)  yes, 10/25/2019, 2019   IMAGING (IMAGES & REPORT)  yes, 2022 -- CT ABD PELVIS -- [facility]  2022 -- US PELVIC --        PRE-VISIT CHECKLIST      Record collection complete yes   Appointment appropriately scheduled           (right time/right provider) Yes   Joint diagnostic appointment coordinated correctly          (ensure right order & amount of time) Yes   MyChart activation No   Questionnaire complete If no, please explain pending     Action 02/15/2022 JTV 1:51pm   Action Taken [facility] sent a request to [facility] for images to be pushed.      Action 2022 JTV 6:44pm   Action Taken [facility] received and resolved images from .

## 2022-02-24 ENCOUNTER — PRE VISIT (OUTPATIENT)
Dept: UROLOGY | Facility: CLINIC | Age: 47
End: 2022-02-24
Payer: COMMERCIAL